# Patient Record
Sex: MALE | Race: BLACK OR AFRICAN AMERICAN | NOT HISPANIC OR LATINO | Employment: FULL TIME | URBAN - METROPOLITAN AREA
[De-identification: names, ages, dates, MRNs, and addresses within clinical notes are randomized per-mention and may not be internally consistent; named-entity substitution may affect disease eponyms.]

---

## 2024-05-04 ENCOUNTER — HOSPITAL ENCOUNTER (EMERGENCY)
Facility: CLINIC | Age: 24
Discharge: HOME | End: 2024-05-04
Attending: EMERGENCY MEDICINE | Admitting: EMERGENCY MEDICINE

## 2024-05-04 VITALS
DIASTOLIC BLOOD PRESSURE: 82 MMHG | WEIGHT: 122 LBS | OXYGEN SATURATION: 99 % | RESPIRATION RATE: 16 BRPM | TEMPERATURE: 97.7 F | SYSTOLIC BLOOD PRESSURE: 134 MMHG | HEART RATE: 80 BPM | BODY MASS INDEX: 18.49 KG/M2 | HEIGHT: 68 IN

## 2024-05-04 DIAGNOSIS — G89.18 POST-OP PAIN: ICD-10-CM

## 2024-05-04 DIAGNOSIS — Z48.89 ENCOUNTER FOR POST SURGICAL WOUND CHECK: ICD-10-CM

## 2024-05-04 PROCEDURE — 99282 EMERGENCY DEPT VISIT SF MDM: CPT

## 2024-05-04 ASSESSMENT — COLUMBIA-SUICIDE SEVERITY RATING SCALE - C-SSRS
2. HAVE YOU ACTUALLY HAD ANY THOUGHTS OF KILLING YOURSELF IN THE PAST MONTH?: NO
6. HAVE YOU EVER DONE ANYTHING, STARTED TO DO ANYTHING, OR PREPARED TO DO ANYTHING TO END YOUR LIFE?: NO
1. IN THE PAST MONTH, HAVE YOU WISHED YOU WERE DEAD OR WISHED YOU COULD GO TO SLEEP AND NOT WAKE UP?: YES

## 2024-05-04 ASSESSMENT — ACTIVITIES OF DAILY LIVING (ADL)
ADLS_ACUITY_SCORE: 35
ADLS_ACUITY_SCORE: 35

## 2024-05-04 NOTE — ED TRIAGE NOTES
"BP (!) 142/90   Pulse 84   Temp 97.7  F (36.5  C) (Temporal)   Resp 19   Ht 1.727 m (5' 8\")   Wt 55.3 kg (122 lb)   SpO2 99%   BMI 18.55 kg/m      Pt here with post-op complications after circumcision on April 7th. He has signs of infection including swelling and pus. No fever. It's very painful and some of the staples are still on the site.      Triage Assessment (Adult)       Row Name 05/04/24 1508          Triage Assessment    Airway WDL WDL        Respiratory WDL    Respiratory WDL WDL        Skin Circulation/Temperature WDL    Skin Circulation/Temperature WDL WDL        Cardiac WDL    Cardiac WDL WDL        Peripheral/Neurovascular WDL    Peripheral Neurovascular WDL WDL        Cognitive/Neuro/Behavioral WDL    Cognitive/Neuro/Behavioral WDL WDL                     "

## 2024-05-04 NOTE — ED PROVIDER NOTES
"EMERGENCY DEPARTMENT ENCOUNTER      NAME: Fritz Mai  AGE: 24 year old male  YOB: 2000  MRN: [id number]  EVALUATION DATE & TIME: 5/4/2024  3:19 PM    PCP: System, Provider Not In    ED PROVIDER: Brennen Burnette M.D.      Chief Complaint   Patient presents with    Post-op Problem    Penis/Scrotum Problem         FINAL IMPRESSION:  Post circumcision pain  Urethral stricture  ED COURSE & MEDICAL DECISION MAKING:    Pertinent Labs & Imaging studies reviewed. (See chart for details)  24 year old male presents to the Emergency Department for evaluation of difficulties with wound healing after circumcision and irregular urinary stream.  Patient reports history of phimosis.  Underwent circumcision on 4/7/2024 in [place].  Had been with family members in [place] when symptoms worsening led to surgery.  Now in the [facility] with continued discomfort primarily with the coronal sutures.  Says there are still some discharge from it is states painful.  Also reports his urine is \"spraying everywhere\".  Semination reveals tiny staples within the corona adherent to a small plastic ring.  Along the left lateral aspect of the corona there is a small area continued excoriation.  Inspection of the meatus reveals it to be quite small.  No obvious suprapubic tenderness to suggest retention.  Will discuss circumstances with urology.. Patient appears non toxic with stable vitals signs. Overall exam is benign.          3:40 PM I met with the patient for the initial interview and physical examination. Discussed plan for treatment and workup in the ED.    4:27 PM I rechecked and updated the patient on results.  Patient discussed with on-call urology.  They recommend follow-up on Monday.  Patient is agreeable.    At the conclusion of the encounter I discussed the results of all of the tests and the disposition. The questions were answered and return precautions provided. The patient or family acknowledged understanding " "and was agreeable with the care plan.       PPE: Provider wore gloves.    MEDICATIONS GIVEN IN THE EMERGENCY:  Medications - No data to display    NEW PRESCRIPTIONS STARTED AT TODAY'S ER VISIT  New Prescriptions    No medications on file          =================================================================    HPI    Patient information was obtained from: patient     Use of Intrepreter: N/A         Fritz Mai is a 24 year old male with a pertient medical history of s/p circumcision 4/7/2024 who presents to the ED for evaluation of penis/scrotum problem.  Patient states he ended up having \"phimosis\" while visiting family in [place].  Had surgical repair done there early last month.  Was lost to follow-up after returning here.  Now reports discomfort along the left side of his penis where \"the wound looks raw\".  Also states he continues to spray urine everywhere when he urinates.  Denies any urinary frequency or dysuria.            REVIEW OF SYSTEMS   Constitutional:  Denies fever, chills  Respiratory:  Denies productive cough or increased work of breathing  Cardiovascular:  Denies chest pain, palpitations  GI:  Denies abdominal pain, nausea, vomiting, or change in bowel or bladder habits   Musculoskeletal:  Denies any new muscle/joint swelling  Skin:  Denies rash   Neurologic:  Denies focal weakness  All systems negative except as marked.     PAST MEDICAL HISTORY:  No past medical history on file.    PAST SURGICAL HISTORY:  No past surgical history on file.      CURRENT MEDICATIONS:    No current facility-administered medications for this encounter.  No current outpatient medications on file.    ALLERGIES:  No Known Allergies    FAMILY HISTORY:  No family history on file.    SOCIAL HISTORY:   Social History     Socioeconomic History    Marital status: Single       VITALS:  Patient Vitals for the past 24 hrs:   BP Temp Temp src Pulse Resp SpO2 Height Weight   05/04/24 1507 (!) 142/90 97.7  F (36.5  C) " "Temporal 84 19 99 % 1.727 m (5' 8\") 55.3 kg (122 lb)        PHYSICAL EXAM    Constitutional:  Awake, alert, in no apparent distress  HENT:  Normocephalic, Atraumatic. Bilateral external ears normal. Oropharynx moist. Nose normal. Neck- Normal range of motion with no guarding, No midline cervical tenderness, Supple, No stridor.   Eyes:  PERRL, EOMI with no signs of entrapment, Conjunctiva normal, No discharge.   Respiratory:  Normal breath sounds, No respiratory distress, No wheezing.    Cardiovascular:  Normal heart rate, Normal rhythm, No appreciable rubs or gallops.   : Patient with tiny staples with small plastic ring on the corona.  Along the left lateral aspect some slight excoriation.  Some slight swelling to the inferior corona.  Mildly tender.  Meatus quite small.  No obvious foreign body.  Testes unremarkable   GI:  Soft, No tenderness, No distension, No palpable masses  Musculoskeletal:  Intact distal pulses, No edema. Good range of motion in all major joints. No tenderness to palpation or major deformities noted.  Integument:  Warm, Dry, No erythema, No rash.   Neurologic:  Alert & oriented, Normal motor function, Normal sensory function, No focal deficits noted.   Psychiatric:  Affect normal, Judgment normal, Mood normal.            I, Jimbo Solares, am serving as a scribe to document services personally performed by Brennen Burnette MD, based on my observation and the provider's statements to me. I, Brennen Burnette MD attest that Jimbo Solares is acting in a scribe capacity, has observed my performance of the services and has documented them in accordance with my direction.    Brennen Burnette M.D.  Emergency Medicine  [facility] EMERGENCY ROOM     Brennen Burnette MD  05/04/24 2127    "

## 2024-05-04 NOTE — DISCHARGE INSTRUCTIONS
Continue to apply Vaseline to your wound.  Call [facility] on Monday and tell them of your emergency room visit and the need for follow-up.